# Patient Record
Sex: MALE | Race: BLACK OR AFRICAN AMERICAN | Employment: UNEMPLOYED | URBAN - METROPOLITAN AREA
[De-identification: names, ages, dates, MRNs, and addresses within clinical notes are randomized per-mention and may not be internally consistent; named-entity substitution may affect disease eponyms.]

---

## 2017-01-08 ENCOUNTER — HOSPITAL ENCOUNTER (EMERGENCY)
Age: 62
Discharge: HOME | End: 2017-01-08
Attending: EMERGENCY MEDICINE
Payer: SELF-PAY

## 2017-01-08 VITALS
SYSTOLIC BLOOD PRESSURE: 120 MMHG | RESPIRATION RATE: 20 BRPM | OXYGEN SATURATION: 95 % | TEMPERATURE: 98 F | DIASTOLIC BLOOD PRESSURE: 84 MMHG | HEIGHT: 65 IN | WEIGHT: 150 LBS | HEART RATE: 115 BPM | BODY MASS INDEX: 24.99 KG/M2

## 2017-01-08 DIAGNOSIS — K70.11 ASCITES DUE TO ALCOHOLIC HEPATITIS: Primary | ICD-10-CM

## 2017-01-08 LAB
ALBUMIN SERPL BCP-MCNC: 2.6 G/DL (ref 3.2–4.6)
ALBUMIN/GLOB SERPL: 0.5 {RATIO} (ref 1.2–3.5)
ALP SERPL-CCNC: 747 U/L (ref 50–136)
ALT SERPL-CCNC: 46 U/L (ref 12–65)
ANION GAP BLD CALC-SCNC: 13 MMOL/L (ref 7–16)
AST SERPL W P-5'-P-CCNC: 181 U/L (ref 15–37)
BASOPHILS # BLD AUTO: 0 K/UL (ref 0–0.2)
BASOPHILS # BLD: 0 % (ref 0–2)
BILIRUB SERPL-MCNC: 3.2 MG/DL (ref 0.2–1.1)
BUN SERPL-MCNC: 57 MG/DL (ref 8–23)
CALCIUM SERPL-MCNC: 8.3 MG/DL (ref 8.3–10.4)
CHLORIDE SERPL-SCNC: 104 MMOL/L (ref 98–107)
CO2 SERPL-SCNC: 22 MMOL/L (ref 21–32)
CREAT SERPL-MCNC: 2.84 MG/DL (ref 0.8–1.5)
DIFFERENTIAL METHOD BLD: ABNORMAL
EOSINOPHIL # BLD: 0 K/UL (ref 0–0.8)
EOSINOPHIL NFR BLD: 0 % (ref 0.5–7.8)
ERYTHROCYTE [DISTWIDTH] IN BLOOD BY AUTOMATED COUNT: 17.5 % (ref 11.9–14.6)
GLOBULIN SER CALC-MCNC: 5.2 G/DL (ref 2.3–3.5)
GLUCOSE SERPL-MCNC: 124 MG/DL (ref 65–100)
HCT VFR BLD AUTO: 36.2 % (ref 41.1–50.3)
HGB BLD-MCNC: 12.4 G/DL (ref 13.6–17.2)
IMM GRANULOCYTES # BLD: 0.1 K/UL (ref 0–0.5)
IMM GRANULOCYTES NFR BLD AUTO: 0.6 % (ref 0–5)
INR PPP: 1.1 (ref 0.9–1.2)
LYMPHOCYTES # BLD AUTO: 12 % (ref 13–44)
LYMPHOCYTES # BLD: 1.5 K/UL (ref 0.5–4.6)
MCH RBC QN AUTO: 34.9 PG (ref 26.1–32.9)
MCHC RBC AUTO-ENTMCNC: 34.3 G/DL (ref 31.4–35)
MCV RBC AUTO: 102 FL (ref 79.6–97.8)
MONOCYTES # BLD: 1.5 K/UL (ref 0.1–1.3)
MONOCYTES NFR BLD AUTO: 12 % (ref 4–12)
NEUTS SEG # BLD: 9.7 K/UL (ref 1.7–8.2)
NEUTS SEG NFR BLD AUTO: 75 % (ref 43–78)
PLATELET # BLD AUTO: 148 K/UL (ref 150–450)
PMV BLD AUTO: 11.8 FL (ref 10.8–14.1)
POTASSIUM SERPL-SCNC: 5 MMOL/L (ref 3.5–5.1)
PROT SERPL-MCNC: 7.8 G/DL (ref 6.3–8.2)
PROTHROMBIN TIME: 12.5 SEC (ref 9.6–12)
RBC # BLD AUTO: 3.55 M/UL (ref 4.23–5.67)
SODIUM SERPL-SCNC: 139 MMOL/L (ref 136–145)
WBC # BLD AUTO: 12.8 K/UL (ref 4.3–11.1)

## 2017-01-08 PROCEDURE — 85025 COMPLETE CBC W/AUTO DIFF WBC: CPT | Performed by: EMERGENCY MEDICINE

## 2017-01-08 PROCEDURE — 85610 PROTHROMBIN TIME: CPT | Performed by: EMERGENCY MEDICINE

## 2017-01-08 PROCEDURE — 74011250637 HC RX REV CODE- 250/637: Performed by: EMERGENCY MEDICINE

## 2017-01-08 PROCEDURE — 99284 EMERGENCY DEPT VISIT MOD MDM: CPT | Performed by: EMERGENCY MEDICINE

## 2017-01-08 PROCEDURE — 80053 COMPREHEN METABOLIC PANEL: CPT | Performed by: EMERGENCY MEDICINE

## 2017-01-08 RX ORDER — TRAMADOL HYDROCHLORIDE 50 MG/1
50 TABLET ORAL
Status: COMPLETED | OUTPATIENT
Start: 2017-01-08 | End: 2017-01-08

## 2017-01-08 RX ORDER — TRAMADOL HYDROCHLORIDE 50 MG/1
50 TABLET ORAL
Qty: 12 TAB | Refills: 0 | Status: SHIPPED | OUTPATIENT
Start: 2017-01-08

## 2017-01-08 RX ADMIN — TRAMADOL HYDROCHLORIDE 50 MG: 50 TABLET, FILM COATED ORAL at 06:05

## 2017-01-08 NOTE — ED PROVIDER NOTES
HPI Comments: Patient presents with complaint of worsening abdominal distention, making it uncomfortable for him to breathe or move. He was recently in the hospital for similar, underwent paracentesis at that time with significant improvement. Instructed to follow-up with the [facility], but has failed to do so. Patient continues to drink daily. Patient is a 62 y.o. male presenting with abdominal pain. The history is provided by the patient. Abdominal Pain    This is a chronic problem. The current episode started more than 1 week ago. The problem occurs constantly. The problem has been gradually worsening. Associated with: cirrhosis with continued alcohol consumption. The pain is located in the generalized abdominal region. The quality of the pain is aching. The pain is at a severity of 3/10. Associated symptoms include anorexia. Pertinent negatives include no fever, no belching, no diarrhea, no flatus, no hematochezia, no melena, no nausea, no vomiting, no constipation, no dysuria, no frequency, no hematuria, no headaches, no arthralgias, no myalgias, no trauma, no chest pain, no testicular pain and no back pain. The pain is worsened by activity and palpation. The pain is relieved by nothing. His past medical history is significant for cancer. His past medical history does not include PUD, gallstones, GERD, ulcerative colitis, Crohn's disease, irritable bowel syndrome, UTI, pancreatitis, diverticulitis, atrial fibrillation, DM, kidney stones or small bowel obstruction.  right nephrectomy for cancer       Past Medical History:   Diagnosis Date    Anemia, unspecified 6/17/2015    Arthritis      gout    Chronic gouty arthropathy without mention of tophus (tophi) 6/17/2015    Chronic kidney disease, unspecified 6/17/2015    Diabetes ([address]     [facility] [address] [facility]     ETOH abuse 9/23/2015    Hypercholesteremia     Hypertension     Liver failure ([address]     Other and unspecified hyperlipidemia 6/17/2015       Past Surgical History:   Procedure Laterality Date    Hx other surgical Right      cancerous tumor removed from kidney: 4-15         History reviewed. No pertinent family history. Social History     Social History    Marital status:      Spouse name: N/A    Number of children: N/A    Years of education: N/A     Occupational History    Not on file. Social History Main Topics    Smoking status: Former Smoker    Smokeless tobacco: Never Used    Alcohol use 0.0 oz/week      Comment: 1 pint/day    Drug use: No    Sexual activity: Not on file     Other Topics Concern    Not on file     Social History Narrative         ALLERGIES: Review of patient's allergies indicates no known allergies. Review of Systems   Constitutional: Negative for chills and fever. Cardiovascular: Negative for chest pain. Gastrointestinal: Positive for abdominal pain and anorexia. Negative for constipation, diarrhea, flatus, hematochezia, melena, nausea and vomiting. Genitourinary: Negative for dysuria, frequency, hematuria and testicular pain. Musculoskeletal: Negative for arthralgias, back pain and myalgias. Neurological: Negative for headaches. All other systems reviewed and are negative. Vitals:    01/08/17 0313   BP: (P) 116/82   Pulse: (!) (P) 117   Resp: (P) 18   Temp: (P) 98.1 °F (36.7 °C)   SpO2: (P) 97%            Physical Exam   Constitutional: He is oriented to person, place, and time. He appears well-developed and well-nourished. No distress. Patient awakened from sleep for exam.   HENT:   Head: Normocephalic and atraumatic. Right Ear: Tympanic membrane and external ear normal.   Left Ear: Tympanic membrane and external ear normal.   Mouth/Throat: Oropharynx is clear and moist.   Eyes: Conjunctivae and EOM are normal. Pupils are equal, round, and reactive to light. Neck: Normal range of motion. Neck supple. No tracheal deviation present.    Cardiovascular: Normal rate, regular rhythm, normal heart sounds and intact distal pulses. Exam reveals no gallop and no friction rub. No murmur heard. Pulmonary/Chest: Effort normal and breath sounds normal. No respiratory distress. He has no wheezes. Abdominal: Soft. Bowel sounds are normal. He exhibits shifting dullness, distension, fluid wave and ascites. He exhibits no pulsatile liver, no abdominal bruit, no pulsatile midline mass and no mass. There is no hepatosplenomegaly. There is generalized tenderness (minimal). There is no rigidity, no rebound, no guarding, no CVA tenderness, no tenderness at McBurney's point and negative Dill's sign. No hernia. Musculoskeletal: Normal range of motion. He exhibits no edema. Lymphadenopathy:     He has no cervical adenopathy. Neurological: He is alert and oriented to person, place, and time. He displays normal reflexes. No cranial nerve deficit. Skin: Skin is warm and dry. No rash noted. He is not diaphoretic. No erythema. Psychiatric: He has a normal mood and affect. Nursing note and vitals reviewed. MDM  Number of Diagnoses or Management Options     Amount and/or Complexity of Data Reviewed  Review and summarize past medical records: yes (Physician Discharge Summary        Patient: Kelvin Aragon MRN: [id number]  SSN: [id number]   YOB: 1955  Age: 62 y.o. Sex: male   PCP: Adeola Roberts MD     Admit date: 12/17/2016  Admitting Provider: Pam Juarez MD     Discharge date: 12/18/2016  Discharging Provider: Pam Juarez MD     * Admission Diagnoses: Ascites     * Discharge Diagnoses:   Hospital Problems as of 12/18/2016  Date Reviewed: 9/22/2015            Codes Class Noted - Resolved POA    * (Principal)Ascites ICD-10-CM: R18.8  ICD-9-CM: 789.59   12/17/2016 - Present Unknown               * Hospital Course:     Mr. Ariel Sung is a 63 yo AAM, with alcoholic cirrhosis and ongoing alcohol abuse drinking about a pint of liquor a day, who presented 12/17 for shortness of breath and nausea due to accumulated ascites. He underwent a 5 liter paracentesis with no complications. Creatinine 1.84 today from 2 yesterday. He feels much better. No signs of infection from fluid analysis. Abdomen non tender and he is tolerating PO. He is being started on lasix 40 mg PO daily. He was supposed to have a follow up as outpt regarding his cirrhosis/ascites but did not go. [facility] should [facility] up with them in about a week. He is medically stable for discharge home as he is no longer short of breath and is on room air.      )      ED Course       Procedures    The patient was observed in the ED. Results Reviewed:      Recent Results (from the past 24 hour(s))   CBC WITH AUTOMATED DIFF    Collection Time: 01/08/17  3:35 AM   Result Value Ref Range    WBC 12.8 (H) 4.3 - 11.1 K/uL    RBC 3.55 (L) 4.23 - 5.67 M/uL    HGB 12.4 (L) 13.6 - 17.2 g/dL    HCT 36.2 (L) 41.1 - 50.3 %    .0 (H) 79.6 - 97.8 FL    MCH 34.9 (H) 26.1 - 32.9 PG    MCHC 34.3 31.4 - 35.0 g/dL    RDW 17.5 (H) 11.9 - 14.6 %    PLATELET 490 (L) 706 - 450 K/uL    MPV 11.8 10.8 - 14.1 FL    DF AUTOMATED      NEUTROPHILS 75 43 - 78 %    LYMPHOCYTES 12 (L) 13 - 44 %    MONOCYTES 12 4.0 - 12.0 %    EOSINOPHILS 0 (L) 0.5 - 7.8 %    BASOPHILS 0 0.0 - 2.0 %    IMMATURE GRANULOCYTES 0.6 0.0 - 5.0 %    ABS. NEUTROPHILS 9.7 (H) 1.7 - 8.2 K/UL    ABS. LYMPHOCYTES 1.5 0.5 - 4.6 K/UL    ABS. MONOCYTES 1.5 (H) 0.1 - 1.3 K/UL    ABS. EOSINOPHILS 0.0 0.0 - 0.8 K/UL    ABS. BASOPHILS 0.0 0.0 - 0.2 K/UL    ABS. IMM.  GRANS. 0.1 0.0 - 0.5 K/UL   METABOLIC PANEL, COMPREHENSIVE    Collection Time: 01/08/17  3:35 AM   Result Value Ref Range    Sodium 139 136 - 145 mmol/L    Potassium 5.0 3.5 - 5.1 mmol/L    Chloride 104 98 - 107 mmol/L    CO2 22 21 - 32 mmol/L    Anion gap 13 7 - 16 mmol/L    Glucose 124 (H) 65 - 100 mg/dL    BUN 57 (H) 8 - 23 MG/DL    Creatinine 2.84 (H) 0.8 - 1.5 MG/DL    GFR est AA 29 (L) >60 ml/min/1.73m2    GFR est non-AA 24 (L) >60 ml/min/1.73m2    Calcium 8.3 8.3 - 10.4 MG/DL    Bilirubin, total 3.2 (H) 0.2 - 1.1 MG/DL    ALT 46 12 - 65 U/L     (H) 15 - 37 U/L    Alk. phosphatase 747 (H) 50 - 136 U/L    Protein, total 7.8 6.3 - 8.2 g/dL    Albumin 2.6 (L) 3.2 - 4.6 g/dL    Globulin 5.2 (H) 2.3 - 3.5 g/dL    A-G Ratio 0.5 (L) 1.2 - 3.5     PROTHROMBIN TIME + INR    Collection Time: 01/08/17  4:25 AM   Result Value Ref Range    Prothrombin time 12.5 (H) 9.6 - 12.0 sec    INR 1.1 0.9 - 1.2         I discussed the results of all labs, procedures, radiographs, and treatments with the patient and available family. Treatment plan is agreed upon and the patient is ready for discharge. All voiced understanding of the discharge plan and medication instructions or changes as appropriate. Questions about treatment in the ED were answered. All were encouraged to return should symptoms worsen or new problems develop.

## 2017-01-08 NOTE — DISCHARGE INSTRUCTIONS
Alcoholic Hepatitis: Care Instructions  Your Care Instructions    Alcoholic hepatitis is damage to the liver. It is caused by long-term heavy drinking. The liver gets inflamed and swollen and can't remove waste products from the body. Alcoholic hepatitis can lead to permanent scarring in the liver. This scarring is called cirrhosis. If you stop drinking, your liver function may improve. You may have to make changes in your diet and adjust your medicines. Follow-up care is a key part of your treatment and safety. Be sure to make and go to all appointments, and call your doctor if you are having problems. It's also a good idea to know your test results and keep a list of the medicines you take. How can you care for yourself at home? · Do not drink alcohol. Alcohol can damage the liver. Tell your doctor if you need help to quit. Counseling, support groups, and sometimes medicines can help you stay sober. · Be safe with medicines. Take your medicines exactly as prescribed. Call your doctor if you think you are having a problem with your medicine. You will get more details on the specific medicines your doctor prescribes. · Do not take any over-the-counter medicines, including ibuprofen (Advil, Motrin), naproxen (Aleve), and acetaminophen (Tylenol), unless your doctor tells you to. · Make sure your doctor knows all of the medicines, vitamins, supplements, and herbal remedies you take. · Follow your doctor's instructions about your diet. Make an appointment with a dietitian if you need help choosing the right foods. When should you call for help? Call 911 anytime you think you may need emergency care. For example, call if:  · You are confused or are having a hard time thinking. · You are very sleepy or cannot stay awake. · You passed out (lost consciousness). · You have severe trouble breathing. · You vomit blood or what looks like coffee grounds. · You pass maroon or very bloody stools.   Call your doctor now or seek immediate medical care if:  · You have a fever or chills. · You are dizzy or lightheaded, or you feel like you may faint. · You have belly pain or swelling. · You vomit or have severe nausea. · You have new bruises or blood spots under your skin. · Your stools are black and look like tar, or have streaks of blood. Watch closely for changes in your health, and be sure to contact your doctor if:  · You have a nosebleed. · Your gums bleed when you brush your teeth. · Your skin itches, or itching gets worse. · You are not able to eat well and are losing weight. · You feel more tired than usual.  · Your skin or the whites of your eyes turn yellow, or yellowness increases. Where can you learn more? Go to http://rip-chuck.info/. Enter L639 in the search box to learn more about \"Alcoholic Hepatitis: Care Instructions. \"  Current as of: May 24, 2016  Content Version: 11.1  © 7493-8270 [employer]. Care instructions adapted under license by [employer] (which disclaims liability or warranty for this information). If you have questions about a medical condition or this instruction, always ask your healthcare professional. [address] any warranty or liability for your use of this information.

## 2017-01-08 NOTE — ED TRIAGE NOTES
Patient to ed via ems from home with c/o abdominal distention and shortness of breath--reports symptoms x1 week--ems reports bgl 160--ems states unable to obtain IV access--patient reports fluid taken off abdomen about a week or two ago--patient with hx of cirrhosis states last alcoholic beverage last night--patient abdomen distended at this time--patient is awake, alert and oriented x4 at this time